# Patient Record
Sex: FEMALE | Race: WHITE | NOT HISPANIC OR LATINO | URBAN - METROPOLITAN AREA
[De-identification: names, ages, dates, MRNs, and addresses within clinical notes are randomized per-mention and may not be internally consistent; named-entity substitution may affect disease eponyms.]

---

## 2022-12-20 ENCOUNTER — APPOINTMENT (OUTPATIENT)
Dept: URBAN - METROPOLITAN AREA SURGERY 17 | Age: 37
Setting detail: DERMATOLOGY
End: 2022-12-21

## 2022-12-20 VITALS
RESPIRATION RATE: 16 BRPM | HEART RATE: 78 BPM | DIASTOLIC BLOOD PRESSURE: 92 MMHG | SYSTOLIC BLOOD PRESSURE: 146 MMHG | HEIGHT: 55 IN | TEMPERATURE: 98.3 F | WEIGHT: 113 LBS

## 2022-12-20 PROBLEM — C44.619 BASAL CELL CARCINOMA OF SKIN OF LEFT UPPER LIMB, INCLUDING SHOULDER: Status: ACTIVE | Noted: 2022-12-20

## 2022-12-20 PROCEDURE — OTHER CONSULTATION FOR MOHS SURGERY: OTHER

## 2022-12-20 PROCEDURE — OTHER MIPS QUALITY: OTHER

## 2022-12-20 PROCEDURE — 12034 INTMD RPR S/TR/EXT 7.6-12.5: CPT

## 2022-12-20 PROCEDURE — 17313 MOHS 1 STAGE T/A/L: CPT

## 2022-12-20 PROCEDURE — OTHER MOHS SURGERY: OTHER

## 2022-12-20 PROCEDURE — 17314 MOHS ADDL STAGE T/A/L: CPT

## 2022-12-20 NOTE — PROCEDURE: MOHS SURGERY

## 2022-12-20 NOTE — PROCEDURE: MOHS SURGERY

## 2022-12-20 NOTE — PROCEDURE: MOHS SURGERY

## 2022-12-20 NOTE — PROCEDURE: CONSULTATION FOR MOHS SURGERY
Detail Level: Detailed
Body Location Override (Optional - Billing Will Still Be Based On Selected Body Map Location If Applicable): left upper arm
Size Of Lesion: 2.1
X Size Of Lesion In Cm (Optional): 1.6
Incorporate Mauc In Note: Yes

## 2022-12-20 NOTE — PROCEDURE: MOHS SURGERY
Post-care instructions were reviewed in detail. A handout was provided. Patient is not to engage in any heavy lifting, exercise, or swimming for the next 14 days. Should the patient develop any fevers, chills, bleeding, severe pain patient will contact the office immediately.\\n\\nA dose of both Tylenol 1000mg and ibuprofen 400mg (at the same time) was recommended shortly after surgery, and every 6 hours as needed for pain. Pain not adequately relieved or severe pain should be reported to our office or the provider on call. Avoid any additional aspirin containing products. Cell phone numbers of the providers were handed out. \\n\\nIce packs should also be used post surgically and may be placed over the wound dressing to help with pain and swelling, and bleeding. The ice pack is placed over the wound for fifteen minutes and may be repeated four to six times per day for 2-3 days.\\n\\nPatient plans to have staples removed with referring provider closer to home.

## 2023-11-22 NOTE — PROCEDURE: MOHS SURGERY
Medication:   Alendronate 70 mg      Last Filled:      Patient Phone Number: [phone] (home)     Last appt: 10/10/2022   Next appt: 10/17/23 - No Show.  No future appointment scheduled    Last Labs DM: No results found for: \"LABA1C\"  Last Lipid:   Lab Results   Component Value Date/Time    CHOL 180 04/24/2023 07:39 AM    TRIG 62 04/24/2023 07:39 AM    HDL 89 04/24/2023 07:39 AM    HDL 85 01/25/2011 07:25 AM    LDLCALC 79 04/24/2023 07:39 AM     Last PSA: No results found for: \"PSA\"  Last Thyroid:   Lab Results   Component Value Date/Time    TSH 1.74 04/24/2023 07:39 AM    T0NTOFX 0.82 03/14/2012 04:44 PM    C3ZNHRL 0.82 03/14/2012 04:44 PM    T4FREE 0.93 03/14/2012 04:44 PM    T4FREE 0.93 03/14/2012 04:44 PM Why Was The Change Made?: Please Select the Appropriate Response